# Patient Record
Sex: MALE | Race: WHITE | NOT HISPANIC OR LATINO
[De-identification: names, ages, dates, MRNs, and addresses within clinical notes are randomized per-mention and may not be internally consistent; named-entity substitution may affect disease eponyms.]

---

## 2018-12-17 ENCOUNTER — APPOINTMENT (OUTPATIENT)
Dept: OTOLARYNGOLOGY | Facility: CLINIC | Age: 48
End: 2018-12-17

## 2019-01-02 ENCOUNTER — APPOINTMENT (OUTPATIENT)
Dept: OTOLARYNGOLOGY | Facility: CLINIC | Age: 49
End: 2019-01-02

## 2019-03-06 ENCOUNTER — EMERGENCY (EMERGENCY)
Facility: HOSPITAL | Age: 49
LOS: 1 days | Discharge: HOME | End: 2019-03-06
Attending: EMERGENCY MEDICINE | Admitting: EMERGENCY MEDICINE
Payer: COMMERCIAL

## 2019-03-06 VITALS
RESPIRATION RATE: 19 BRPM | WEIGHT: 315 LBS | HEIGHT: 70 IN | DIASTOLIC BLOOD PRESSURE: 90 MMHG | SYSTOLIC BLOOD PRESSURE: 159 MMHG | TEMPERATURE: 104 F | HEART RATE: 115 BPM | OXYGEN SATURATION: 100 %

## 2019-03-06 DIAGNOSIS — R50.9 FEVER, UNSPECIFIED: ICD-10-CM

## 2019-03-06 PROCEDURE — 93010 ELECTROCARDIOGRAM REPORT: CPT

## 2019-03-06 PROCEDURE — 99284 EMERGENCY DEPT VISIT MOD MDM: CPT | Mod: 25

## 2019-03-06 RX ORDER — SODIUM CHLORIDE 9 MG/ML
1000 INJECTION INTRAMUSCULAR; INTRAVENOUS; SUBCUTANEOUS ONCE
Qty: 0 | Refills: 0 | Status: COMPLETED | OUTPATIENT
Start: 2019-03-06 | End: 2019-03-06

## 2019-03-06 RX ORDER — ACETAMINOPHEN 500 MG
650 TABLET ORAL ONCE
Qty: 0 | Refills: 0 | Status: COMPLETED | OUTPATIENT
Start: 2019-03-06 | End: 2019-03-06

## 2019-03-06 RX ADMIN — SODIUM CHLORIDE 2000 MILLILITER(S): 9 INJECTION INTRAMUSCULAR; INTRAVENOUS; SUBCUTANEOUS at 23:30

## 2019-03-06 RX ADMIN — Medication 650 MILLIGRAM(S): at 23:40

## 2019-03-07 VITALS
RESPIRATION RATE: 17 BRPM | OXYGEN SATURATION: 97 % | HEART RATE: 104 BPM | SYSTOLIC BLOOD PRESSURE: 133 MMHG | DIASTOLIC BLOOD PRESSURE: 78 MMHG | TEMPERATURE: 101 F

## 2019-03-07 LAB
ALBUMIN SERPL ELPH-MCNC: 3.8 G/DL — SIGNIFICANT CHANGE UP (ref 3.3–5)
ALP SERPL-CCNC: 71 U/L — SIGNIFICANT CHANGE UP (ref 40–120)
ALT FLD-CCNC: 111 U/L DA — HIGH (ref 10–45)
ANION GAP SERPL CALC-SCNC: 11 MMOL/L — SIGNIFICANT CHANGE UP (ref 5–17)
APPEARANCE UR: CLEAR — SIGNIFICANT CHANGE UP
AST SERPL-CCNC: 85 U/L — HIGH (ref 10–40)
BASOPHILS # BLD AUTO: 0.1 K/UL — SIGNIFICANT CHANGE UP (ref 0–0.2)
BASOPHILS NFR BLD AUTO: 0.7 % — SIGNIFICANT CHANGE UP (ref 0–2)
BILIRUB SERPL-MCNC: 0.6 MG/DL — SIGNIFICANT CHANGE UP (ref 0.2–1.2)
BILIRUB UR-MCNC: NEGATIVE — SIGNIFICANT CHANGE UP
BUN SERPL-MCNC: 20 MG/DL — SIGNIFICANT CHANGE UP (ref 7–23)
CALCIUM SERPL-MCNC: 9.2 MG/DL — SIGNIFICANT CHANGE UP (ref 8.4–10.5)
CHLORIDE SERPL-SCNC: 99 MMOL/L — SIGNIFICANT CHANGE UP (ref 96–108)
CO2 SERPL-SCNC: 28 MMOL/L — SIGNIFICANT CHANGE UP (ref 22–31)
COLOR SPEC: YELLOW — SIGNIFICANT CHANGE UP
CREAT SERPL-MCNC: 1.51 MG/DL — HIGH (ref 0.5–1.3)
DIFF PNL FLD: NEGATIVE — SIGNIFICANT CHANGE UP
EOSINOPHIL # BLD AUTO: 0.1 K/UL — SIGNIFICANT CHANGE UP (ref 0–0.5)
EOSINOPHIL NFR BLD AUTO: 1.3 % — SIGNIFICANT CHANGE UP (ref 0–6)
FLU A RESULT: SIGNIFICANT CHANGE UP
FLU A RESULT: SIGNIFICANT CHANGE UP
FLUAV AG NPH QL: SIGNIFICANT CHANGE UP
FLUBV AG NPH QL: SIGNIFICANT CHANGE UP
GLUCOSE SERPL-MCNC: 115 MG/DL — HIGH (ref 70–99)
GLUCOSE UR QL: NEGATIVE — SIGNIFICANT CHANGE UP
HCT VFR BLD CALC: 43.4 % — SIGNIFICANT CHANGE UP (ref 39–50)
HGB BLD-MCNC: 13.9 G/DL — SIGNIFICANT CHANGE UP (ref 13–17)
KETONES UR-MCNC: NEGATIVE — SIGNIFICANT CHANGE UP
LACTATE SERPL-SCNC: 1.7 MMOL/L — SIGNIFICANT CHANGE UP (ref 0.7–2)
LEUKOCYTE ESTERASE UR-ACNC: NEGATIVE — SIGNIFICANT CHANGE UP
LYMPHOCYTES # BLD AUTO: 0.4 K/UL — LOW (ref 1–3.3)
LYMPHOCYTES # BLD AUTO: 5.2 % — LOW (ref 13–44)
MCHC RBC-ENTMCNC: 28.9 PG — SIGNIFICANT CHANGE UP (ref 27–34)
MCHC RBC-ENTMCNC: 32 GM/DL — SIGNIFICANT CHANGE UP (ref 32–36)
MCV RBC AUTO: 90.3 FL — SIGNIFICANT CHANGE UP (ref 80–100)
MONOCYTES # BLD AUTO: 0.4 K/UL — SIGNIFICANT CHANGE UP (ref 0–0.9)
MONOCYTES NFR BLD AUTO: 5.3 % — SIGNIFICANT CHANGE UP (ref 1–9)
NEUTROPHILS # BLD AUTO: 7 K/UL — SIGNIFICANT CHANGE UP (ref 1.8–7.4)
NEUTROPHILS NFR BLD AUTO: 87.6 % — HIGH (ref 43–77)
NITRITE UR-MCNC: NEGATIVE — SIGNIFICANT CHANGE UP
PH UR: 5 — SIGNIFICANT CHANGE UP (ref 5–8)
PLATELET # BLD AUTO: 215 K/UL — SIGNIFICANT CHANGE UP (ref 150–400)
POTASSIUM SERPL-MCNC: 3.9 MMOL/L — SIGNIFICANT CHANGE UP (ref 3.5–5.3)
POTASSIUM SERPL-SCNC: 3.9 MMOL/L — SIGNIFICANT CHANGE UP (ref 3.5–5.3)
PROT SERPL-MCNC: 8.2 G/DL — SIGNIFICANT CHANGE UP (ref 6–8.3)
PROT UR-MCNC: NEGATIVE — SIGNIFICANT CHANGE UP
RBC # BLD: 4.8 M/UL — SIGNIFICANT CHANGE UP (ref 4.2–5.8)
RBC # FLD: 14.4 % — SIGNIFICANT CHANGE UP (ref 10.3–14.5)
RSV RESULT: SIGNIFICANT CHANGE UP
RSV RNA RESP QL NAA+PROBE: SIGNIFICANT CHANGE UP
SODIUM SERPL-SCNC: 138 MMOL/L — SIGNIFICANT CHANGE UP (ref 135–145)
SP GR SPEC: 1.01 — SIGNIFICANT CHANGE UP (ref 1.01–1.02)
UROBILINOGEN FLD QL: NEGATIVE — SIGNIFICANT CHANGE UP
WBC # BLD: 8 K/UL — SIGNIFICANT CHANGE UP (ref 3.8–10.5)
WBC # FLD AUTO: 8 K/UL — SIGNIFICANT CHANGE UP (ref 3.8–10.5)

## 2019-03-07 PROCEDURE — 83605 ASSAY OF LACTIC ACID: CPT

## 2019-03-07 PROCEDURE — 99284 EMERGENCY DEPT VISIT MOD MDM: CPT

## 2019-03-07 PROCEDURE — 74176 CT ABD & PELVIS W/O CONTRAST: CPT | Mod: 26

## 2019-03-07 PROCEDURE — 80053 COMPREHEN METABOLIC PANEL: CPT

## 2019-03-07 PROCEDURE — 87631 RESP VIRUS 3-5 TARGETS: CPT

## 2019-03-07 PROCEDURE — 71045 X-RAY EXAM CHEST 1 VIEW: CPT | Mod: 26

## 2019-03-07 PROCEDURE — 85027 COMPLETE CBC AUTOMATED: CPT

## 2019-03-07 PROCEDURE — 93005 ELECTROCARDIOGRAM TRACING: CPT

## 2019-03-07 PROCEDURE — 81001 URINALYSIS AUTO W/SCOPE: CPT

## 2019-03-07 PROCEDURE — 71045 X-RAY EXAM CHEST 1 VIEW: CPT

## 2019-03-07 PROCEDURE — 74176 CT ABD & PELVIS W/O CONTRAST: CPT

## 2019-03-07 RX ORDER — IBUPROFEN 200 MG
600 TABLET ORAL ONCE
Qty: 0 | Refills: 0 | Status: COMPLETED | OUTPATIENT
Start: 2019-03-07 | End: 2019-03-07

## 2019-03-07 RX ADMIN — Medication 600 MILLIGRAM(S): at 01:15

## 2019-03-07 NOTE — ED PROVIDER NOTE - OBJECTIVE STATEMENT
Pertinent PMH/PSH/FHx/SHx and Review of Systems contained within:  48 /o male with h/o HTN and obesity presents to ed c/o body ache and fever started few hours ago. No cough, no headche, no N/V

## 2019-03-07 NOTE — ED PROVIDER NOTE - CLINICAL SUMMARY MEDICAL DECISION MAKING FREE TEXT BOX
pt has fever likely from viral illness as normal labs, negative CXR, hydrate pt has fever likely from viral illness as normal labs, negative CXR, lab mild elevation of creatine, other wise normal, advise to stay hydrated and take tylenol for fever PRN

## 2019-03-07 NOTE — ED ADULT NURSE NOTE - OBJECTIVE STATEMENT
Pt reports fever, chills, body aches x  1 day.  Pt presents with 103.8 temp. Pt is a/ox3, neuro intact, maew.

## 2019-03-07 NOTE — ED ADULT NURSE NOTE - TEMPLATE
After Visit Summary   1/16/2018    Sabina Herron    MRN: [id number]           Patient Information     Date Of Birth          1987        Visit Information        Provider Department      1/16/2018 1:30 PM Aubrie Bello [facility] Fetal [facility]        Today's Diagnoses     Pregnancy related condition, antepartum           Follow-ups after your visit        Your next 10 appointments already scheduled     Feb 16, 2018  1:30 PM CST   (Arrive by 1:15 PM)   MFM US COMPRE SINGLE F/U with [id number]   [facility] Maternal Fetal Medicine Ultrasound - [facility])    [address]  [facility] [address] [phone]           Wear comfortable clothes and leave your valuables at home.            Feb 16, 2018  2:00 PM CST   Radiology MD with  MFM MD   [facility] Maternal Fetal Medicine [facility])    [address]  [facility] [address] [phone]           Please arrive at the time given for your first appointment. This visit is used internally to schedule the physician's time during your ultrasound.              Who to contact     If you have questions or need follow up information about today's clinic visit or your schedule please contact [facility] FETAL [facility] directly at [phone].  Normal or non-critical lab and imaging results will be communicated to you by MyChart, letter or phone within 4 business days after the clinic has received the results. If you do not hear from us within 7 days, please contact the clinic through MyChart or phone. If you have a critical or abnormal lab result, we will notify you by phone as soon as possible.  Submit refill requests through [employer] or call your pharmacy and they will forward the refill request to us. Please allow 3 business days for your refill to be completed.          Additional Information About Your Visit       "  MyChart Information     [employer] lets you send messages to your doctor, view your test results, renew your prescriptions, schedule appointments and more. To sign up, go to [email or web address] . Click on \"Log in\" on the left side of the screen, which will take you to the Welcome page. Then click on \"Sign up Now\" on the right side of the page.     You will be asked to enter the access code listed below, as well as some personal information. Please follow the directions to create your username and password.     Your access code is: [email or web address]  Expires: 2018 11:15 AM     Your access code will  in 90 days. If you need help or a new code, please call your [facility] clinic or [phone].        Care EveryWhere ID     This is your Care EveryWhere ID. This could be used by other organizations to access your [facility] medical records  [id number]        Your Vitals Were     Last Period                   2017 (Approximate)            Blood Pressure from Last 3 Encounters:   14 128/86   07/15/13 123/83    Weight from Last 3 Encounters:   14 108.5 kg (239 lb 3.2 oz)   07/15/13 103 kg (227 lb 1.2 oz)              We Performed the Following     [facility]        Primary Care Provider Office Phone # Fax #    Ron Nicholas [phone]       [address] [address]        Equal Access to Services     OPAL AKINS AH: Hadii aad ku hadasho Soomaali, waaxda luqadaha, qaybta kaalmada adeegyada, jaguar basilio . So [facility] [phone].    ATENCIÓN: Si habla español, tiene a hogan disposición servicios gratuitos de asistencia lingüística. Llame al [phone].    We comply with applicable federal civil rights laws and [place] laws. We do not discriminate on the basis of race, color, national origin, age, disability, sex, sexual orientation, or gender identity.            Thank you!     Thank you for choosing [facility]ERNAL [facility]IC[facility]  for your care. Our goal is always to provide you with excellent care. Hearing back from our patients is one way we can continue to improve our services. Please take a few minutes to complete the written survey that you may receive in the mail after your visit with us. Thank you!             Your Updated Medication List - Protect others around you: Learn how to safely use, store and throw away your medicines at www.disposemymeds.org.          This list is accurate as of: 1/16/18 11:59 PM.  Always use your most recent med list.                   Brand Name Dispense Instructions for use Diagnosis    AMBIEN PO      Take 10 mg by mouth At Bedtime        diazepam 5 MG tablet    VALIUM     Take 5 mg by mouth every 6 hours as needed. Up to 4 times daily        fentaNYL 75 mcg/hr 72 hr patch    DURAGESIC     Place 1 patch onto the skin every 72 hours.        LIDODERM 5 % Patch   Generic drug:  lidocaine      Place 1 patch onto the skin as needed.        LUNESTA 3 MG tablet   Generic drug:  eszopiclone      Take 1 tablet by mouth At Bedtime.        LYRICA 150 MG capsule   Generic drug:  pregabalin      Take 1 capsule by mouth 2 times daily.        oxyCODONE IR 5 MG tablet    ROXICODONE     Take 3 tablets by mouth daily. Up to 10 times daily        prenatal multivitamin plus iron 27-0.8 MG Tabs per tablet      Take 1 tablet by mouth daily        SIMPLY SLEEP 25 MG Tabs   Generic drug:  diphenhydrAMINE HCl (Sleep)      Take 1 tablet by mouth as needed.        TIZANIDINE HCL PO      Take 4 mg by mouth 4 times daily        TRAZODONE HCL PO      Take 1 tablet by mouth daily.           General

## 2019-08-19 ENCOUNTER — APPOINTMENT (OUTPATIENT)
Dept: ORTHOPEDIC SURGERY | Facility: CLINIC | Age: 49
End: 2019-08-19
Payer: COMMERCIAL

## 2019-08-19 VITALS — BODY MASS INDEX: 40.29 KG/M2 | WEIGHT: 272 LBS | HEIGHT: 69 IN

## 2019-08-19 DIAGNOSIS — M17.0 BILATERAL PRIMARY OSTEOARTHRITIS OF KNEE: ICD-10-CM

## 2019-08-19 DIAGNOSIS — S80.01XA CONTUSION OF RIGHT KNEE, INITIAL ENCOUNTER: ICD-10-CM

## 2019-08-19 DIAGNOSIS — Z78.9 OTHER SPECIFIED HEALTH STATUS: ICD-10-CM

## 2019-08-19 DIAGNOSIS — Z82.61 FAMILY HISTORY OF ARTHRITIS: ICD-10-CM

## 2019-08-19 PROBLEM — E66.9 OBESITY, UNSPECIFIED: Chronic | Status: ACTIVE | Noted: 2019-03-07

## 2019-08-19 PROCEDURE — 20610 DRAIN/INJ JOINT/BURSA W/O US: CPT | Mod: RT

## 2019-08-19 PROCEDURE — 99204 OFFICE O/P NEW MOD 45 MIN: CPT | Mod: 25

## 2019-08-19 PROCEDURE — 73562 X-RAY EXAM OF KNEE 3: CPT | Mod: RT

## 2019-08-19 RX ORDER — LEVOTHYROXINE SODIUM 100 UG/1
100 TABLET ORAL
Refills: 0 | Status: ACTIVE | COMMUNITY

## 2019-08-19 RX ORDER — ALLOPURINOL 100 MG/1
100 TABLET ORAL
Refills: 0 | Status: ACTIVE | COMMUNITY

## 2021-09-21 ENCOUNTER — TRANSCRIPTION ENCOUNTER (OUTPATIENT)
Age: 51
End: 2021-09-21

## 2021-10-13 ENCOUNTER — EMERGENCY (EMERGENCY)
Facility: HOSPITAL | Age: 51
LOS: 1 days | Discharge: HOME | End: 2021-10-13
Attending: EMERGENCY MEDICINE | Admitting: EMERGENCY MEDICINE
Payer: MEDICAID

## 2021-10-13 ENCOUNTER — TRANSCRIPTION ENCOUNTER (OUTPATIENT)
Age: 51
End: 2021-10-13

## 2021-10-13 VITALS
SYSTOLIC BLOOD PRESSURE: 176 MMHG | RESPIRATION RATE: 19 BRPM | DIASTOLIC BLOOD PRESSURE: 117 MMHG | HEIGHT: 70 IN | WEIGHT: 244.93 LBS | HEART RATE: 82 BPM | TEMPERATURE: 100 F | OXYGEN SATURATION: 98 %

## 2021-10-13 VITALS
SYSTOLIC BLOOD PRESSURE: 154 MMHG | RESPIRATION RATE: 18 BRPM | DIASTOLIC BLOOD PRESSURE: 89 MMHG | HEART RATE: 89 BPM | OXYGEN SATURATION: 100 %

## 2021-10-13 LAB
ALBUMIN SERPL ELPH-MCNC: 4.2 G/DL — SIGNIFICANT CHANGE UP (ref 3.3–5)
ALP SERPL-CCNC: 105 U/L — SIGNIFICANT CHANGE UP (ref 40–120)
ALT FLD-CCNC: 34 U/L — SIGNIFICANT CHANGE UP (ref 10–45)
ANION GAP SERPL CALC-SCNC: 8 MMOL/L — SIGNIFICANT CHANGE UP (ref 5–17)
APPEARANCE UR: ABNORMAL
AST SERPL-CCNC: 26 U/L — SIGNIFICANT CHANGE UP (ref 10–40)
BACTERIA # UR AUTO: ABNORMAL /HPF
BASOPHILS # BLD AUTO: 0.08 K/UL — SIGNIFICANT CHANGE UP (ref 0–0.2)
BASOPHILS NFR BLD AUTO: 0.5 % — SIGNIFICANT CHANGE UP (ref 0–2)
BILIRUB SERPL-MCNC: 0.8 MG/DL — SIGNIFICANT CHANGE UP (ref 0.2–1.2)
BILIRUB UR-MCNC: NEGATIVE — SIGNIFICANT CHANGE UP
BUN SERPL-MCNC: 28 MG/DL — HIGH (ref 7–23)
CALCIUM SERPL-MCNC: 9.4 MG/DL — SIGNIFICANT CHANGE UP (ref 8.4–10.5)
CHLORIDE SERPL-SCNC: 101 MMOL/L — SIGNIFICANT CHANGE UP (ref 96–108)
CO2 SERPL-SCNC: 31 MMOL/L — SIGNIFICANT CHANGE UP (ref 22–31)
COLOR SPEC: ABNORMAL
CREAT SERPL-MCNC: 1.51 MG/DL — HIGH (ref 0.5–1.3)
DIFF PNL FLD: ABNORMAL
EOSINOPHIL # BLD AUTO: 0.15 K/UL — SIGNIFICANT CHANGE UP (ref 0–0.5)
EOSINOPHIL NFR BLD AUTO: 1 % — SIGNIFICANT CHANGE UP (ref 0–6)
EPI CELLS # UR: SIGNIFICANT CHANGE UP
GLUCOSE SERPL-MCNC: 104 MG/DL — HIGH (ref 70–99)
GLUCOSE UR QL: NEGATIVE — SIGNIFICANT CHANGE UP
HCT VFR BLD CALC: 41.2 % — SIGNIFICANT CHANGE UP (ref 39–50)
HGB BLD-MCNC: 13.5 G/DL — SIGNIFICANT CHANGE UP (ref 13–17)
IMM GRANULOCYTES NFR BLD AUTO: 0.4 % — SIGNIFICANT CHANGE UP (ref 0–1.5)
KETONES UR-MCNC: ABNORMAL
LEUKOCYTE ESTERASE UR-ACNC: NEGATIVE — SIGNIFICANT CHANGE UP
LYMPHOCYTES # BLD AUTO: 1.35 K/UL — SIGNIFICANT CHANGE UP (ref 1–3.3)
LYMPHOCYTES # BLD AUTO: 8.8 % — LOW (ref 13–44)
MCHC RBC-ENTMCNC: 30.9 PG — SIGNIFICANT CHANGE UP (ref 27–34)
MCHC RBC-ENTMCNC: 32.8 GM/DL — SIGNIFICANT CHANGE UP (ref 32–36)
MCV RBC AUTO: 94.3 FL — SIGNIFICANT CHANGE UP (ref 80–100)
MONOCYTES # BLD AUTO: 0.9 K/UL — SIGNIFICANT CHANGE UP (ref 0–0.9)
MONOCYTES NFR BLD AUTO: 5.8 % — SIGNIFICANT CHANGE UP (ref 2–14)
NEUTROPHILS # BLD AUTO: 12.85 K/UL — HIGH (ref 1.8–7.4)
NEUTROPHILS NFR BLD AUTO: 83.5 % — HIGH (ref 43–77)
NITRITE UR-MCNC: NEGATIVE — SIGNIFICANT CHANGE UP
NRBC # BLD: 0 /100 WBCS — SIGNIFICANT CHANGE UP (ref 0–0)
PH UR: 7 — SIGNIFICANT CHANGE UP (ref 5–8)
PLATELET # BLD AUTO: 273 K/UL — SIGNIFICANT CHANGE UP (ref 150–400)
POTASSIUM SERPL-MCNC: 4.2 MMOL/L — SIGNIFICANT CHANGE UP (ref 3.5–5.3)
POTASSIUM SERPL-SCNC: 4.2 MMOL/L — SIGNIFICANT CHANGE UP (ref 3.5–5.3)
PROT SERPL-MCNC: 8.9 G/DL — HIGH (ref 6–8.3)
PROT UR-MCNC: 500 MG/DL
RBC # BLD: 4.37 M/UL — SIGNIFICANT CHANGE UP (ref 4.2–5.8)
RBC # FLD: 13.2 % — SIGNIFICANT CHANGE UP (ref 10.3–14.5)
RBC CASTS # UR COMP ASSIST: >50 /HPF (ref 0–4)
SODIUM SERPL-SCNC: 140 MMOL/L — SIGNIFICANT CHANGE UP (ref 135–145)
SP GR SPEC: 1.01 — SIGNIFICANT CHANGE UP (ref 1.01–1.02)
UROBILINOGEN FLD QL: NEGATIVE — SIGNIFICANT CHANGE UP
WBC # BLD: 15.39 K/UL — HIGH (ref 3.8–10.5)
WBC # FLD AUTO: 15.39 K/UL — HIGH (ref 3.8–10.5)
WBC UR QL: SIGNIFICANT CHANGE UP /HPF (ref 0–5)

## 2021-10-13 PROCEDURE — 96365 THER/PROPH/DIAG IV INF INIT: CPT

## 2021-10-13 PROCEDURE — 99284 EMERGENCY DEPT VISIT MOD MDM: CPT | Mod: 25

## 2021-10-13 PROCEDURE — 99284 EMERGENCY DEPT VISIT MOD MDM: CPT

## 2021-10-13 PROCEDURE — 80053 COMPREHEN METABOLIC PANEL: CPT

## 2021-10-13 PROCEDURE — 74176 CT ABD & PELVIS W/O CONTRAST: CPT | Mod: 26,MA

## 2021-10-13 PROCEDURE — 85025 COMPLETE CBC W/AUTO DIFF WBC: CPT

## 2021-10-13 PROCEDURE — 36415 COLL VENOUS BLD VENIPUNCTURE: CPT

## 2021-10-13 PROCEDURE — 81001 URINALYSIS AUTO W/SCOPE: CPT

## 2021-10-13 PROCEDURE — 74176 CT ABD & PELVIS W/O CONTRAST: CPT | Mod: MA

## 2021-10-13 PROCEDURE — 87086 URINE CULTURE/COLONY COUNT: CPT

## 2021-10-13 RX ORDER — TAMSULOSIN HYDROCHLORIDE 0.4 MG/1
1 CAPSULE ORAL
Qty: 10 | Refills: 0
Start: 2021-10-13 | End: 2021-10-22

## 2021-10-13 RX ORDER — SODIUM CHLORIDE 9 MG/ML
1000 INJECTION INTRAMUSCULAR; INTRAVENOUS; SUBCUTANEOUS ONCE
Refills: 0 | Status: COMPLETED | OUTPATIENT
Start: 2021-10-13 | End: 2021-10-13

## 2021-10-13 RX ORDER — CEFTRIAXONE 500 MG/1
1000 INJECTION, POWDER, FOR SOLUTION INTRAMUSCULAR; INTRAVENOUS ONCE
Refills: 0 | Status: COMPLETED | OUTPATIENT
Start: 2021-10-13 | End: 2021-10-13

## 2021-10-13 RX ORDER — TAMSULOSIN HYDROCHLORIDE 0.4 MG/1
0.8 CAPSULE ORAL ONCE
Refills: 0 | Status: COMPLETED | OUTPATIENT
Start: 2021-10-13 | End: 2021-10-13

## 2021-10-13 RX ADMIN — SODIUM CHLORIDE 2000 MILLILITER(S): 9 INJECTION INTRAMUSCULAR; INTRAVENOUS; SUBCUTANEOUS at 21:41

## 2021-10-13 RX ADMIN — SODIUM CHLORIDE 1000 MILLILITER(S): 9 INJECTION INTRAMUSCULAR; INTRAVENOUS; SUBCUTANEOUS at 22:11

## 2021-10-13 RX ADMIN — TAMSULOSIN HYDROCHLORIDE 0.8 MILLIGRAM(S): 0.4 CAPSULE ORAL at 21:41

## 2021-10-13 RX ADMIN — CEFTRIAXONE 1000 MILLIGRAM(S): 500 INJECTION, POWDER, FOR SOLUTION INTRAMUSCULAR; INTRAVENOUS at 22:11

## 2021-10-13 RX ADMIN — CEFTRIAXONE 100 MILLIGRAM(S): 500 INJECTION, POWDER, FOR SOLUTION INTRAMUSCULAR; INTRAVENOUS at 21:41

## 2021-10-13 NOTE — ED PROVIDER NOTE - PATIENT PORTAL LINK FT
You can access the FollowMyHealth Patient Portal offered by [facility] by registering at the following website: [email or web address]. By joining [employer]’s FollowMyHealth portal, you will also be able to view your health information using other applications (apps) compatible with our system.

## 2021-10-13 NOTE — ED PROVIDER NOTE - CLINICAL SUMMARY MEDICAL DECISION MAKING FREE TEXT BOX
pt with no sig PMHX p/w F/U/D and hematuria, no fever, mod elevation of WBC count, pt was given IV Rocephin and Flomax , he felt better, pt was told about his CT scan result and a small nodule found in bladder , which needs to be f/u with urologist for biopsy/

## 2021-10-13 NOTE — ED PROVIDER NOTE - OBJECTIVE STATEMENT
50 y/o male with h/o obesity presents to ED c/o increased frequency , urgency and hematuria since past few hours, c/o he has problem urinating at night, no flank pain , no fever,  had similar episode 14 years ago,

## 2021-10-14 LAB
CULTURE RESULTS: SIGNIFICANT CHANGE UP
SPECIMEN SOURCE: SIGNIFICANT CHANGE UP

## 2021-10-15 ENCOUNTER — APPOINTMENT (OUTPATIENT)
Dept: AFTER HOURS CARE | Facility: EMERGENCY ROOM | Age: 51
End: 2021-10-15

## 2021-10-15 ENCOUNTER — APPOINTMENT (OUTPATIENT)
Dept: AFTER HOURS CARE | Facility: EMERGENCY ROOM | Age: 51
End: 2021-10-15
Payer: MEDICAID

## 2021-10-16 DIAGNOSIS — R21 RASH AND OTHER NONSPECIFIC SKIN ERUPTION: ICD-10-CM

## 2021-10-16 PROCEDURE — 99214 OFFICE O/P EST MOD 30 MIN: CPT | Mod: 95

## 2021-10-16 NOTE — ASSESSMENT
[FreeTextEntry1] : Gout vs cellulitis, on abx already seeing podiatrist for a corn, requests uric blodo test constantine lab fly told to call back if not improved 24 hr

## 2021-10-16 NOTE — PHYSICAL EXAM
[No Acute Distress] : no acute distress [Normal Sclera/Conjunctiva] : normal sclera/conjunctiva [No Respiratory Distress] : no respiratory distress  [No Focal Deficits] : no focal deficits [de-identified] : R dorsum  warm at tarsal joints, no e.o trauma no fluctiance min redness

## 2021-10-16 NOTE — HISTORY OF PRESENT ILLNESS
[Home] : at home, [unfilled] , at the time of the visit. [Other Location: e.g. Home (Enter Location, City,State)___] : at [unfilled] [Verbal consent obtained from patient] : the patient, [unfilled] [FreeTextEntry8] : pt w hx gout, recent dx uti on cefpox, has been having r dorsum foot pain c/w gout but a little higher. no fever no trauma. usually indocin works. on allopurinol

## 2021-11-17 ENCOUNTER — OUTPATIENT (OUTPATIENT)
Dept: OUTPATIENT SERVICES | Facility: HOSPITAL | Age: 51
LOS: 1 days | End: 2021-11-17
Payer: MEDICAID

## 2021-11-17 ENCOUNTER — APPOINTMENT (OUTPATIENT)
Dept: CT IMAGING | Facility: HOSPITAL | Age: 51
End: 2021-11-17
Payer: MEDICAID

## 2021-11-17 DIAGNOSIS — Z00.8 ENCOUNTER FOR OTHER GENERAL EXAMINATION: ICD-10-CM

## 2021-11-17 PROCEDURE — 74178 CT ABD&PLV WO CNTR FLWD CNTR: CPT | Mod: 26

## 2021-11-17 PROCEDURE — 74178 CT ABD&PLV WO CNTR FLWD CNTR: CPT

## 2021-11-27 ENCOUNTER — APPOINTMENT (OUTPATIENT)
Dept: AFTER HOURS CARE | Facility: EMERGENCY ROOM | Age: 51
End: 2021-11-27

## 2021-11-27 ENCOUNTER — APPOINTMENT (OUTPATIENT)
Dept: AFTER HOURS CARE | Facility: EMERGENCY ROOM | Age: 51
End: 2021-11-27
Payer: MEDICAID

## 2021-11-27 DIAGNOSIS — R31.9 HEMATURIA, UNSPECIFIED: ICD-10-CM

## 2021-11-27 PROCEDURE — 99213 OFFICE O/P EST LOW 20 MIN: CPT | Mod: 95

## 2021-11-30 PROBLEM — R31.9 HEMATURIA OF UNKNOWN CAUSE: Status: ACTIVE | Noted: 2021-11-30

## 2021-11-30 NOTE — ASSESSMENT
[FreeTextEntry1] : max dose flomax once a day for fear of hypotension. Pt does not appear to be bleeding enough to cause\par hypovolemia or anemia, and his CBC a month ago was normal. No dysuria makes infection unlikely and pt under care of specialist for same problem already.

## 2021-11-30 NOTE — HISTORY OF PRESENT ILLNESS
[Home] : at home, [unfilled] , at the time of the visit. [Other Location: e.g. Home (Enter Location, City,State)___] : at [unfilled] [Verbal consent obtained from patient] : the patient, [unfilled] [FreeTextEntry8] : 51M hx OA, gout, now p/w gross hematuria. Last month, pt had similar hematuria. Was initially dx as UTI in UC, was\par seen in [facility] ED, had clean UA and nl CT. Was seen by urologist, given flomax, who saw a thicked wall of bladder on\par cysto. Rpt CT no lesions.\par Tonight pt had gross hematuria with frequency, all similar to last month. Took a flomax which made his pain minimal.\par Pt now inquiring if he can take 0.4mg flomax tabs more frequently than once a day. No dysuria, trauma, abd pain,\par n/v/d, fever/chills, HA. Pt denies dizziness, presyncope, LOC, weakness.

## 2021-11-30 NOTE — PLAN
[No new medications perscribed] : Treat in place: No new medications prescribed [FreeTextEntry1] : flomax once a day only\par urgent urology follow up\par ER for sx of hypovolemia/anemia or profuse bleeding

## 2021-12-02 ENCOUNTER — OUTPATIENT (OUTPATIENT)
Dept: OUTPATIENT SERVICES | Facility: HOSPITAL | Age: 51
LOS: 1 days | End: 2021-12-02
Payer: MEDICAID

## 2021-12-02 VITALS
DIASTOLIC BLOOD PRESSURE: 97 MMHG | OXYGEN SATURATION: 98 % | WEIGHT: 266.76 LBS | HEART RATE: 76 BPM | RESPIRATION RATE: 18 BRPM | SYSTOLIC BLOOD PRESSURE: 147 MMHG | TEMPERATURE: 98 F | HEIGHT: 68 IN

## 2021-12-02 DIAGNOSIS — D49.4 NEOPLASM OF UNSPECIFIED BEHAVIOR OF BLADDER: ICD-10-CM

## 2021-12-02 DIAGNOSIS — M10.9 GOUT, UNSPECIFIED: ICD-10-CM

## 2021-12-02 DIAGNOSIS — E03.9 HYPOTHYROIDISM, UNSPECIFIED: ICD-10-CM

## 2021-12-02 DIAGNOSIS — G47.33 OBSTRUCTIVE SLEEP APNEA (ADULT) (PEDIATRIC): ICD-10-CM

## 2021-12-02 LAB
ANION GAP SERPL CALC-SCNC: 8 MMOL/L — SIGNIFICANT CHANGE UP (ref 5–17)
APPEARANCE UR: CLEAR — SIGNIFICANT CHANGE UP
BACTERIA # UR AUTO: ABNORMAL /HPF
BILIRUB UR-MCNC: NEGATIVE — SIGNIFICANT CHANGE UP
BUN SERPL-MCNC: 20 MG/DL — SIGNIFICANT CHANGE UP (ref 7–23)
CALCIUM SERPL-MCNC: 9.5 MG/DL — SIGNIFICANT CHANGE UP (ref 8.4–10.5)
CHLORIDE SERPL-SCNC: 101 MMOL/L — SIGNIFICANT CHANGE UP (ref 96–108)
CO2 SERPL-SCNC: 29 MMOL/L — SIGNIFICANT CHANGE UP (ref 22–31)
COLOR SPEC: YELLOW — SIGNIFICANT CHANGE UP
CREAT SERPL-MCNC: 1.45 MG/DL — HIGH (ref 0.5–1.3)
DIFF PNL FLD: ABNORMAL
EPI CELLS # UR: SIGNIFICANT CHANGE UP
GLUCOSE SERPL-MCNC: 95 MG/DL — SIGNIFICANT CHANGE UP (ref 70–99)
GLUCOSE UR QL: NEGATIVE — SIGNIFICANT CHANGE UP
HCT VFR BLD CALC: 41.6 % — SIGNIFICANT CHANGE UP (ref 39–50)
HGB BLD-MCNC: 13.6 G/DL — SIGNIFICANT CHANGE UP (ref 13–17)
KETONES UR-MCNC: NEGATIVE — SIGNIFICANT CHANGE UP
LEUKOCYTE ESTERASE UR-ACNC: NEGATIVE — SIGNIFICANT CHANGE UP
MCHC RBC-ENTMCNC: 29.9 PG — SIGNIFICANT CHANGE UP (ref 27–34)
MCHC RBC-ENTMCNC: 32.7 GM/DL — SIGNIFICANT CHANGE UP (ref 32–36)
MCV RBC AUTO: 91.4 FL — SIGNIFICANT CHANGE UP (ref 80–100)
NITRITE UR-MCNC: NEGATIVE — SIGNIFICANT CHANGE UP
NRBC # BLD: 0 /100 WBCS — SIGNIFICANT CHANGE UP (ref 0–0)
PH UR: 5 — SIGNIFICANT CHANGE UP (ref 5–8)
PLATELET # BLD AUTO: 293 K/UL — SIGNIFICANT CHANGE UP (ref 150–400)
POTASSIUM SERPL-MCNC: 4.8 MMOL/L — SIGNIFICANT CHANGE UP (ref 3.5–5.3)
POTASSIUM SERPL-SCNC: 4.8 MMOL/L — SIGNIFICANT CHANGE UP (ref 3.5–5.3)
PROT UR-MCNC: 15
RBC # BLD: 4.55 M/UL — SIGNIFICANT CHANGE UP (ref 4.2–5.8)
RBC # FLD: 13.2 % — SIGNIFICANT CHANGE UP (ref 10.3–14.5)
RBC CASTS # UR COMP ASSIST: ABNORMAL /HPF (ref 0–4)
SODIUM SERPL-SCNC: 138 MMOL/L — SIGNIFICANT CHANGE UP (ref 135–145)
SP GR SPEC: 1.02 — SIGNIFICANT CHANGE UP (ref 1.01–1.02)
UROBILINOGEN FLD QL: NEGATIVE — SIGNIFICANT CHANGE UP
WBC # BLD: 8.43 K/UL — SIGNIFICANT CHANGE UP (ref 3.8–10.5)
WBC # FLD AUTO: 8.43 K/UL — SIGNIFICANT CHANGE UP (ref 3.8–10.5)
WBC UR QL: ABNORMAL /HPF (ref 0–5)

## 2021-12-02 PROCEDURE — 80048 BASIC METABOLIC PNL TOTAL CA: CPT

## 2021-12-02 PROCEDURE — 93005 ELECTROCARDIOGRAM TRACING: CPT

## 2021-12-02 PROCEDURE — 81001 URINALYSIS AUTO W/SCOPE: CPT

## 2021-12-02 PROCEDURE — 93010 ELECTROCARDIOGRAM REPORT: CPT | Mod: NC

## 2021-12-02 PROCEDURE — 85027 COMPLETE CBC AUTOMATED: CPT

## 2021-12-02 PROCEDURE — 36415 COLL VENOUS BLD VENIPUNCTURE: CPT

## 2021-12-02 PROCEDURE — 87086 URINE CULTURE/COLONY COUNT: CPT

## 2021-12-02 PROCEDURE — G0463: CPT

## 2021-12-02 RX ORDER — LEVOTHYROXINE SODIUM 125 MCG
1 TABLET ORAL
Qty: 0 | Refills: 0 | DISCHARGE

## 2021-12-02 RX ORDER — ALLOPURINOL 300 MG
200 TABLET ORAL
Qty: 0 | Refills: 0 | DISCHARGE

## 2021-12-02 NOTE — H&P PST ADULT - NEGATIVE GENERAL GENITOURINARY SYMPTOMS
no renal colic/no flank pain L/no flank pain R/no bladder infections/no incontinence/no dysuria/no urinary hesitancy/no nocturia

## 2021-12-02 NOTE — H&P PST ADULT - HISTORY OF PRESENT ILLNESS
52yo with medical h/o BREN uses CPAP, Hypothyroid, Gout and reports recent Hematuria, and was evaluated by Urologists and diagnosed with Bladder polyp. Pt presents today for PST for scheduled Cystoscopy w/Bladder Biopsy, TURB on 12/7/2021

## 2021-12-02 NOTE — H&P PST ADULT - SKIN/BREAST
----- Message from DENIZ Bashir MD sent at 5/8/2017 10:59 AM CDT -----  Please refill her Dyazide for 6 months  
Per Dr. Bashir, Dyazide #90 with 1 refill is faxed to [facility] and patient will have her Potassium level re-checked again in 6 months.  
negative

## 2021-12-02 NOTE — H&P PST ADULT - VENOUS THROMBOEMBOLISM BMI
----- Message from Nora Mathews sent at 10/22/2020  9:56 AM EDT -----  Regarding: /refill  Medication Refill    Caller (if not patient):      Relationship of caller (if not patient):      Best contact number(s):[phone]      Name of medication and dosage if known:\"Adderall\" 20 mg 2x daily      Is patient out of this medication (yes/no):no, only 2 days left      Pharmacy name:[facility]    Pharmacy listed in chart? (yes/no):  Pharmacy phone number: [phone]      Details to clarify the request:Pt requested a refill on the Rx and stated there are no refills left, and requested a refill called in today, if poss.       Nora Mathews
Refill request(s) approved--Adderall. Prescription Monitoring Program ([place]) database query with fills:  09/24/2020  1   08/25/2020  Dextroamp-Amphetamin 20 MG Tab  60.00  30 Cl [facility]   [id number]   [facility] ([id number][facility]   [place]   [facility]   [place]   08/25/2020  1   08/25/2020  Dextroamp-Amphetamin 20 MG Tab  60.00  30 Cl [facility]   [id number]   [facility] ([id number])   [place]   07/20/2020  1   07/18/2020  Dextroamp-Amphetamin 20 MG Tab  60.00  30 Cl [facility]   [id number]   [facility] ([id number])   0   [place]     Requested Prescriptions     Signed Prescriptions Disp Refills    dextroamphetamine-amphetamine (ADDERALL) 20 mg tablet 60 Tab 0     Sig: Take 20mg in AM and 20mg again in afternoon as needed. Authorizing Provider: Vivek Leal script written to fill on 10-24-20, as reviewed. Receipt electronically verified by pharmacy.
This nurse called patient to  explained that her prescription will be at the pharmacy on 10/24/2020. It will be electronically sent on that date and she will be able to  that prescription on then.
(1) obesity (BMI greater than 25)

## 2021-12-02 NOTE — H&P PST ADULT - FALL HARM RISK - UNIVERSAL INTERVENTIONS
Bed in lowest position, wheels locked, appropriate side rails in place/Call bell, personal items and telephone in reach/Instruct patient to call for assistance before getting out of bed or chair/Non-slip footwear when patient is out of bed/[place] to call system/Physically safe environment - no spills, clutter or unnecessary equipment/Purposeful Proactive Rounding/Room/bathroom lighting operational, light cord in reach

## 2021-12-02 NOTE — H&P PST ADULT - NSICDXPASTMEDICALHX_GEN_ALL_CORE_FT
PAST MEDICAL HISTORY:  Bladder polyp     Generalized obesity     Gout     Hypothyroid     BREN on CPAP

## 2021-12-02 NOTE — H&P PST ADULT - PROBLEM SELECTOR PLAN 1
Pre-op instructions given. Pt verbalized understanding  Pending: M/C for elevated BP with no dx of HTN   Pending: Covidpcr test/results

## 2021-12-02 NOTE — H&P PST ADULT - TEACHING/LEARNING LEARNING PREFERENCES
75F with T2DM, HTN, osteoporosis, EDUAR on CPAP, diverticulosis s/p resection, hx hysterectomy who comes to ER after syncopal episode. Patient states she had back pain that started one day ago; pain was located in paraspinal region of lower back, non radiating, not associated with numbness, urinary retention or incontinence. She reports no trauma or heavy lifting that precipitated back pain. She then went to do laundry and on carrying the laundry basket felt that she could not move her leg and fell down on her left shoulder. She then came to the ER and reports waiting several hours without being seen and thus went to urgent care where she was standing for chest x-ray and passed out. She reports no presyncopal symptoms; no CP, dizziness, change in hearing or vision, diaphoresis, palpitations. When she passed out she was lying on the floor and expressed urge to urinate; urgent care staff did not want her to stand up and thus she urinated on herself. She insists she has no episodes of incontinence. This has happened to her once before however she did not pass out but experienced presyncopal symptoms such as diaphoresis and dizziness while sitting down and doing her granddaughter's hair that resolved with some fresh air. She was brought to [facility] at that time and reports having complete work up that was negative for any cause of syncope. She denies CP, SOB, fever, chills, abdominal pain, nausea, vomiting, diarrhea, dysuria. Currently she is feeling much better than when she came in.     Hospital course:  EKG: NSR at 82 bpm  CE x2: Negative  CXR: Right paratracheal widening may be technical rather than representing lymphadenopathy and repeat PA and lateral radiograph recommended. No consolidation.  X-ray left shoulder: Limited examination without evidence of acute fracture or dislocation.  X-ray pelvis: No displaced pelvic fracture.  lumbar Xray: No compression fractures. Lower lumbar posterior facet arthrosis. Otherwise, preserved intervertebral disc spaces. Trace grade 1 anterolisthesis of L4 on L5 however without associated spondylolysis defects. Remaining vertebral body alignment maintained. Advanced pubic symphysis degenerative change noted. Unremarkable SI joints and partially visualized hips. Generalized osteopenia otherwise no discrete lytic or blastic lesions. Scant atherosclerotic abdominal aortic calcifications noted.  CT head: No CT evidence of acute intracranial hemorrhage, brain edema, or mass effect.  WBC: 12.02  Glucose: 185  UA: Large LE, UCx less than 50K strep  orthostatic negative   2/7 Carotid Doppler: Mild heterogenous plaque noted within the proximal right and left internal carotid arteries, consistent with 16-49% stenoses in both proximal vessels. No hemodynamically significant stenoses noted.    CE negative pt seen by cardiology recommended carotid doppler and TTE. TTE****. Syncope likely associated with vasovagal. Pt with + UA but low colonies count on UCx, pt asymptomatic  no need for abx. PT recommended home with home PT. As per Dr Wang pt cleared for discharge on *** 75F with T2DM, HTN, osteoporosis, EDUAR on CPAP, diverticulosis s/p resection, hx hysterectomy who comes to ER after syncopal episode. Patient states she had back pain that started one day ago; pain was located in paraspinal region of lower back, non radiating, not associated with numbness, urinary retention or incontinence. She reports no trauma or heavy lifting that precipitated back pain. She then went to do laundry and on carrying the laundry basket felt that she could not move her leg and fell down on her left shoulder. She then came to the ER and reports waiting several hours without being seen and thus went to urgent care where she was standing for chest x-ray and passed out. She reports no presyncopal symptoms; no CP, dizziness, change in hearing or vision, diaphoresis, palpitations. When she passed out she was lying on the floor and expressed urge to urinate; urgent care staff did not want her to stand up and thus she urinated on herself. She insists she has no episodes of incontinence. This has happened to her once before however she did not pass out but experienced presyncopal symptoms such as diaphoresis and dizziness while sitting down and doing her granddaughter's hair that resolved with some fresh air. She was brought to [facility] at that time and reports having complete work up that was negative for any cause of syncope. She denies CP, SOB, fever, chills, abdominal pain, nausea, vomiting, diarrhea, dysuria. Currently she is feeling much better than when she came in.     Hospital course:  EKG: NSR at 82 bpm  CE x2: Negative  CXR: Right paratracheal widening may be technical rather than representing lymphadenopathy and repeat PA and lateral radiograph recommended. No consolidation.  X-ray left shoulder: Limited examination without evidence of acute fracture or dislocation.  X-ray pelvis: No displaced pelvic fracture.  lumbar Xray: No compression fractures. Lower lumbar posterior facet arthrosis. Otherwise, preserved intervertebral disc spaces. Trace grade 1 anterolisthesis of L4 on L5 however without associated spondylolysis defects. Remaining vertebral body alignment maintained. Advanced pubic symphysis degenerative change noted. Unremarkable SI joints and partially visualized hips. Generalized osteopenia otherwise no discrete lytic or blastic lesions. Scant atherosclerotic abdominal aortic calcifications noted.  CT head: No CT evidence of acute intracranial hemorrhage, brain edema, or mass effect.  WBC: 12.02  Glucose: 185  UA: Large LE, UCx less than 50K strep  orthostatic negative   2/7 Carotid Doppler: Mild heterogenous plaque noted within the proximal right and left internal carotid arteries, consistent with 16-49% stenoses in both proximal vessels. No hemodynamically significant stenoses noted.  2/8 TTE: Mitral annular calcification, otherwise normal mitral valve. Minimal mitral regurgitation. Calcified trileaflet aortic valve with normal opening. Mild aortic regurgitation. Normal left ventricular internal dimensions and wall thicknesses. Endocardium not well visualized; grossly normal left ventricular systolic function. The right ventricle is not well visualized; grossly normal right ventricular systolic function.    CE negative pt seen by cardiology recommended carotid doppler and TTE. TTE with normal LV. Syncope likely associated with vasovagal. Pt with + UA but low colonies count on UCx, pt asymptomatic  no need for abx. PT recommended home with home PT. As per Dr Wang pt cleared for discharge on 2/9/18 75F with T2DM, HTN, osteoporosis, EDUAR on CPAP, diverticulosis s/p resection, hx hysterectomy who comes to ER after syncopal episode. Patient states she had back pain that started one day ago; pain was located in paraspinal region of lower back, non radiating, not associated with numbness, urinary retention or incontinence. She reports no trauma or heavy lifting that precipitated back pain. She then went to do laundry and on carrying the laundry basket felt that she could not move her leg and fell down on her left shoulder. She then came to the ER and reports waiting several hours without being seen and thus went to urgent care where she was standing for chest x-ray and passed out. She reports no presyncopal symptoms; no CP, dizziness, change in hearing or vision, diaphoresis, palpitations. When she passed out she was lying on the floor and expressed urge to urinate; urgent care staff did not want her to stand up and thus she urinated on herself. She insists she has no episodes of incontinence. This has happened to her once before however she did not pass out but experienced presyncopal symptoms such as diaphoresis and dizziness while sitting down and doing her granddaughter's hair that resolved with some fresh air. She was brought to [facility] at that time and reports having complete work up that was negative for any cause of syncope. She denies CP, SOB, fever, chills, abdominal pain, nausea, vomiting, diarrhea, dysuria. Currently she is feeling much better than when she came in.     Hospital course:  EKG: NSR at 82 bpm  CE x2: Negative  CXR: Right paratracheal widening may be technical rather than representing lymphadenopathy and repeat PA and lateral radiograph recommended. No consolidation.  X-ray left shoulder: Limited examination without evidence of acute fracture or dislocation.  X-ray pelvis: No displaced pelvic fracture.  lumbar Xray: No compression fractures. Lower lumbar posterior facet arthrosis. Otherwise, preserved intervertebral disc spaces. Trace grade 1 anterolisthesis of L4 on L5 however without associated spondylolysis defects. Remaining vertebral body alignment maintained. Advanced pubic symphysis degenerative change noted. Unremarkable SI joints and partially visualized hips. Generalized osteopenia otherwise no discrete lytic or blastic lesions. Scant atherosclerotic abdominal aortic calcifications noted.  CT head: No CT evidence of acute intracranial hemorrhage, brain edema, or mass effect.  WBC: 12.02  Glucose: 185  UA: Large LE, UCx less than 50K strep  orthostatic negative   2/7 Carotid Doppler: Mild heterogenous plaque noted within the proximal right and left internal carotid arteries, consistent with 16-49% stenoses in both proximal vessels. No hemodynamically significant stenoses noted.  2/8 TTE: Mitral annular calcification, otherwise normal mitral valve. Minimal mitral regurgitation. Calcified trileaflet aortic valve with normal opening. Mild aortic regurgitation. Normal left ventricular internal dimensions and wall thicknesses. Endocardium not well visualized; grossly normal left ventricular systolic function. The right ventricle is not well visualized; grossly normal right ventricular systolic function.    CE negative pt seen by cardiology recommended carotid doppler and TTE. TTE with normal LV. Syncope likely associated with vasovagal. Pt with + UA but low colonies count on UCx, pt asymptomatic  no need for abx. PT recommended home with home PT. As per Dr Wang pt cleared for discharge on 2/9/18 individual instruction/verbal instruction/video/written material

## 2021-12-02 NOTE — H&P PST ADULT - NSICDXFAMILYHX_GEN_ALL_CORE_FT
FAMILY HISTORY:  Father  Still living? No  FH: heart disease, Age at diagnosis: Age Unknown    Mother  Still living? Yes, Estimated age: Age Unknown  FH: heart disease, Age at diagnosis: Age Unknown

## 2021-12-03 LAB
CULTURE RESULTS: SIGNIFICANT CHANGE UP
SPECIMEN SOURCE: SIGNIFICANT CHANGE UP

## 2021-12-08 ENCOUNTER — APPOINTMENT (OUTPATIENT)
Dept: UROLOGY | Facility: CLINIC | Age: 51
End: 2021-12-08
Payer: MEDICAID

## 2021-12-08 VITALS
DIASTOLIC BLOOD PRESSURE: 97 MMHG | HEIGHT: 70 IN | WEIGHT: 245 LBS | OXYGEN SATURATION: 97 % | SYSTOLIC BLOOD PRESSURE: 151 MMHG | HEART RATE: 63 BPM | BODY MASS INDEX: 35.07 KG/M2

## 2021-12-08 DIAGNOSIS — Z87.39 PERSONAL HISTORY OF OTHER DISEASES OF THE MUSCULOSKELETAL SYSTEM AND CONNECTIVE TISSUE: ICD-10-CM

## 2021-12-08 DIAGNOSIS — Z86.39 PERSONAL HISTORY OF OTHER ENDOCRINE, NUTRITIONAL AND METABOLIC DISEASE: ICD-10-CM

## 2021-12-08 PROCEDURE — 99204 OFFICE O/P NEW MOD 45 MIN: CPT

## 2021-12-08 RX ORDER — SILODOSIN 4 MG/1
4 CAPSULE ORAL DAILY
Qty: 30 | Refills: 1 | Status: ACTIVE | COMMUNITY
Start: 2021-12-08 | End: 1900-01-01

## 2021-12-09 PROBLEM — D41.4 NEOPLASM OF UNCERTAIN BEHAVIOR OF BLADDER: Chronic | Status: ACTIVE | Noted: 2021-12-02

## 2021-12-09 PROBLEM — E03.9 HYPOTHYROIDISM, UNSPECIFIED: Chronic | Status: ACTIVE | Noted: 2021-12-02

## 2021-12-09 PROBLEM — M10.9 GOUT, UNSPECIFIED: Chronic | Status: ACTIVE | Noted: 2021-12-02

## 2021-12-09 PROBLEM — G47.33 OBSTRUCTIVE SLEEP APNEA (ADULT) (PEDIATRIC): Chronic | Status: ACTIVE | Noted: 2021-12-02

## 2021-12-09 LAB
APPEARANCE: CLEAR
BACTERIA: NEGATIVE
BILIRUBIN URINE: NEGATIVE
BLOOD URINE: NEGATIVE
COLOR: YELLOW
GLUCOSE QUALITATIVE U: NEGATIVE
HYALINE CASTS: 0 /LPF
KETONES URINE: NEGATIVE
LEUKOCYTE ESTERASE URINE: NEGATIVE
MICROSCOPIC-UA: NORMAL
NITRITE URINE: NEGATIVE
PH URINE: 6.5
PROTEIN URINE: NEGATIVE
RED BLOOD CELLS URINE: 1 /HPF
SPECIFIC GRAVITY URINE: 1.02
SQUAMOUS EPITHELIAL CELLS: 1 /HPF
UROBILINOGEN URINE: NORMAL
WHITE BLOOD CELLS URINE: 1 /HPF

## 2021-12-10 LAB — BACTERIA UR CULT: NORMAL

## 2021-12-13 LAB — URINE CYTOLOGY: NORMAL

## 2021-12-15 ENCOUNTER — APPOINTMENT (OUTPATIENT)
Dept: UROLOGY | Facility: CLINIC | Age: 51
End: 2021-12-15
Payer: MEDICAID

## 2021-12-15 VITALS
DIASTOLIC BLOOD PRESSURE: 86 MMHG | HEART RATE: 65 BPM | SYSTOLIC BLOOD PRESSURE: 136 MMHG | BODY MASS INDEX: 35.07 KG/M2 | WEIGHT: 245 LBS | HEIGHT: 70 IN | OXYGEN SATURATION: 97 %

## 2021-12-15 DIAGNOSIS — N32.89 OTHER SPECIFIED DISORDERS OF BLADDER: ICD-10-CM

## 2021-12-15 DIAGNOSIS — R31.0 GROSS HEMATURIA: ICD-10-CM

## 2021-12-15 DIAGNOSIS — N40.1 BENIGN PROSTATIC HYPERPLASIA WITH LOWER URINARY TRACT SYMPMS: ICD-10-CM

## 2021-12-15 DIAGNOSIS — D49.4 NEOPLASM OF UNSPECIFIED BEHAVIOR OF BLADDER: ICD-10-CM

## 2021-12-15 DIAGNOSIS — N13.8 BENIGN PROSTATIC HYPERPLASIA WITH LOWER URINARY TRACT SYMPMS: ICD-10-CM

## 2021-12-15 PROCEDURE — 99214 OFFICE O/P EST MOD 30 MIN: CPT | Mod: 25

## 2021-12-15 PROCEDURE — 52000 CYSTOURETHROSCOPY: CPT

## 2021-12-20 PROBLEM — N40.1 BPH WITH OBSTRUCTION/LOWER URINARY TRACT SYMPTOMS: Status: ACTIVE | Noted: 2021-12-08

## 2021-12-20 PROBLEM — Z86.39 HISTORY OF HYPOTHYROIDISM: Status: RESOLVED | Noted: 2021-12-20 | Resolved: 2021-12-20

## 2021-12-20 PROBLEM — N32.89 BLADDER MASS: Status: ACTIVE | Noted: 2021-12-08

## 2021-12-20 PROBLEM — Z87.39 HISTORY OF GOUT: Status: RESOLVED | Noted: 2021-12-20 | Resolved: 2021-12-20

## 2021-12-20 PROBLEM — R31.0 GROSS HEMATURIA: Status: ACTIVE | Noted: 2021-12-20

## 2021-12-20 NOTE — ASSESSMENT
[FreeTextEntry1] : Benign Prostatic Hyperplasia:\par Did not take Silodosin. \par No change in urination. \par Will try Silodosin. \par \par Gross hematuria:\par Bladder tumor:\par Discussed Cystoscopy findings. Patient denied previous resection or biopsy or history of Diverticulitis. \par Recommended Transurethral Resection of Bladder Tumor with Intravesical Mitomycin Instillation. \par The operation, benefits, alternatives, risks and possible complications were explained to the patient in great detail.\par The specific risks of this procedure include, but are not limited to: injury to or perforation of prostate capsule or bladder, ureteral injury or obstruction, additional procedures to correct injury (stent, percutaneous renal drainage), urinary tract infection, pyelonephritis, irritative voiding symptoms, incontinence, gross hematuria, clot retention, may require prolonged catheterization or recatheterization, inability to remove all the cancer, inability to get bladder muscle tissue in the specimen for accurate staging, repeat procedures may need to be performed, recurrence of cancer is possible, bladder neck contracture, and erectile dysfunction. \par Discussed that anesthesiologist will discuss the risks and complications of the anesthesia in detail separately. \par Explained that I am doing these procedures at [facility] and that Patient will need medical clearance from PCP and pre-surgical testing at [facility] before the procedure. \par All questions were fully and satisfactorily answered. \par Will schedule for next available.

## 2021-12-20 NOTE — LETTER BODY
[Dear  ___] : Dear  [unfilled], [Courtesy Letter:] : I had the pleasure of seeing your patient, [unfilled], in my office today. [Please see my note below.] : Please see my note below. [Sincerely,] : Sincerely, [FreeTextEntry3] : Chris Bejarano MD\par  of Urology\par [facility] [employer]\par \par Offices:\par [facility] @\par [address], [place] [address]\par and\par [address], [place] [address], [address]\par and\par [address] [address]\par \par TEL: [phone]\par FAX: [phone]

## 2021-12-20 NOTE — ASSESSMENT
[FreeTextEntry1] : Reviewed outside records. \par \par Gross hematuria:\par Bladder mass:\par Discussed the differential diagnosis of hematuria including benign and malignant pathology- including but not limited to nephrolithiasis, bladder stone, urinary tract infection, glomerular disease, renal cancer, bladder cancer, prostate cancer. We also discussed the chance that workup will not reveal a source for the bleeding. The patient understands that the hematuria could be from an upper tract (kidney or ureter) or lower tract (bladder, urethra, prostate) and that workup includes imaging and direct visualization of all of these.\par \par Will proceed with work up with Urinalysis with microscopy, Urine culture, Urine cytology and Cystoscopy. \par \par Benign Prostatic Hyperplasia:\par Discussed treatment options.\par Will try Rapaflo 4 mg. Discussed similar side effect profile as Flomax with slightly increased incidence of retrograde ejaculation. \par \par Return to clinic for next available Cystoscopy.

## 2021-12-20 NOTE — HISTORY OF PRESENT ILLNESS
[FreeTextEntry1] : 51 year old male presents for follow up. \par Did not try Rapaflo. \par No new episode of gross hematuria. \par \par Initially seen for Bladder mass.  \par Here for 2nd opinion. In November had gross hematuria, now resolved. Went to [facility] and had non contrasted CT scan Abdomen and Pelvis: Bladder wall thickening was seen. Saw Dr Wall and underwent CT Urogram and Cystoscopy. Was told has Bladder mass and needs Transurethral Resection of Bladder Tumor. \par Has history of gross hematuria, last episode in 2008. Had negative work up including Cystoscopy. \par Reports variable stream, urinates every 3-4 hours or so during the day. Nocturia of 2-5 x. \par Endorses off and on urgency and sense of incomplete emptying. \par Denies dysuria, lower abdominal or flank pain, fever, chills or rigors.\par Has tried Flomax in the past, had back pain. \par No personal or family history of kidney stone. \par Non smoker. \par No family history of Prostate cancer. \par \par \par

## 2021-12-20 NOTE — HISTORY OF PRESENT ILLNESS
[FreeTextEntry1] : 51 year old male presents for Bladder tumor. \par Here for 2nd opinion. In November had gross hematuria, now resolved. Went to [facility] and had non contrasted CT scan Abdomen and Pelvis: Bladder wall thickening was seen. Saw Dr Wall and underwent CT Urogram and Cystoscopy. Was told has Bladder mass and needs Transurethral Resection of Bladder Tumor. \par Has history of gross hematuria, last episode in 2008. Had negative work up including Cystoscopy. \par Reports variable stream, urinates every 3-4 hours or so during the day. Nocturia of 2-5 x. \par Endorses off and on urgency and sense of incomplete emptying. \par Denies dysuria, lower abdominal or flank pain, fever, chills or rigors.\par Has tried Flomax in the past, had back pain. \par No personal or family history of kidney stone. \par Non smoker. \par No family history of Prostate cancer. \par \par \par

## 2021-12-20 NOTE — PHYSICAL EXAM
[Normal Appearance] : normal appearance [General Appearance - In No Acute Distress] : no acute distress [FreeTextEntry1] : normal peripheral circulation  [] : no respiratory distress [Abdomen Soft] : soft [Abdomen Tenderness] : non-tender [Costovertebral Angle Tenderness] : no ~M costovertebral angle tenderness [Urethral Meatus] : meatus normal [Penis Abnormality] : normal circumcised penis [Scrotum] : the scrotum was normal [Epididymis] : the epididymides were normal [Testes Tenderness] : no tenderness of the testes [Testes Mass (___cm)] : there were no testicular masses [Prostate Tenderness] : the prostate was not tender [No Prostate Nodules] : no prostate nodules [Prostate Size ___ (0-4)] : prostate size [unfilled] (scale: 0-4) [Normal Station and Gait] : the gait and station were normal for the patient's age [Skin Color & Pigmentation] : normal skin color and pigmentation [No Focal Deficits] : no focal deficits [Oriented To Time, Place, And Person] : oriented to person, place, and time

## 2021-12-26 ENCOUNTER — APPOINTMENT (OUTPATIENT)
Dept: AFTER HOURS CARE | Facility: EMERGENCY ROOM | Age: 51
End: 2021-12-26
Payer: MEDICAID

## 2021-12-26 ENCOUNTER — APPOINTMENT (OUTPATIENT)
Dept: AFTER HOURS CARE | Facility: EMERGENCY ROOM | Age: 51
End: 2021-12-26

## 2021-12-26 PROCEDURE — 99213 OFFICE O/P EST LOW 20 MIN: CPT | Mod: 95

## 2021-12-26 NOTE — PLAN
[No new medications perscribed] : Treat in place: No new medications prescribed [FreeTextEntry1] : follow up COVID PCR. If negative, can come out of isolation\par anticipatory guidance.

## 2021-12-26 NOTE — HISTORY OF PRESENT ILLNESS
[Home] : at home, [unfilled] , at the time of the visit. [Other Location: e.g. Home (Enter Location, City,State)___] : at [unfilled] [Verbal consent obtained from patient] : the patient, [unfilled] [FreeTextEntry8] : 51M hx BPH, OA, gout now p/w request for advice on leaving isolation for COVID. Pt was having headaches 12d ago, tested positive 1 week ago, but for the past 5d has been isolating without symptoms. Pt was PCR tested again today, results pending.

## 2022-03-28 ENCOUNTER — TRANSCRIPTION ENCOUNTER (OUTPATIENT)
Age: 52
End: 2022-03-28

## 2022-07-09 ENCOUNTER — NON-APPOINTMENT (OUTPATIENT)
Age: 52
End: 2022-07-09

## 2022-07-11 ENCOUNTER — APPOINTMENT (OUTPATIENT)
Dept: AFTER HOURS CARE | Facility: EMERGENCY ROOM | Age: 52
End: 2022-07-11

## 2022-07-11 DIAGNOSIS — U07.1 COVID-19: ICD-10-CM

## 2022-07-11 PROCEDURE — 99204 OFFICE O/P NEW MOD 45 MIN: CPT | Mod: 95

## 2022-07-11 PROCEDURE — 99214 OFFICE O/P EST MOD 30 MIN: CPT

## 2022-07-20 PROBLEM — U07.1 COVID-19 VIRUS INFECTION: Status: ACTIVE | Noted: 2021-12-26

## 2022-07-20 NOTE — HISTORY OF PRESENT ILLNESS
[Home] : at home, [unfilled] , at the time of the visit. [Other Location: e.g. Home (Enter Location, City,State)___] : at [unfilled] [Verbal consent obtained from patient] : the patient, [unfilled] [FreeTextEntry8] : 50 Yo m hx of hypothyroidism, gout, BREN on cpap, c/o covid symptoms. Pt reports symptoms started yesterday and he took a covid test that was positive yesterday. He reports Fever, stuffy nose and malaise. The nasal congestion triggers his anxiety, for which he took Xanax yesterday. Pt has been taking Tylenol, motrin, Zyrtec and afrin for his symptoms and is concerned about not taking more than the daily recommendation of these medications.

## 2022-07-20 NOTE — ASSESSMENT
[FreeTextEntry1] : Pt w/ aforementioned presentation concerning for covid19. Pt has no dyspnea, no respiratory distress, requesting paxlovid

## 2022-07-20 NOTE — PLAN
[FreeTextEntry1] : \par 1)	Will send the Rx for paxlovid to [address]\par 2)	Pt advised to take ibuprofen alternating with acetaminophen as needed for bodyaches or fevers. I discussed with the patient the maximum daily dosages of these medications and how often to take them\par 3)	Pt encouraged to hydrate with Gatorade\par 4)	Discussed warning signs of severe symptoms necessitating ER evaluation\par

## 2022-07-20 NOTE — PHYSICAL EXAM
[de-identified] : General: pt appears stated age and is anxious but in nad, speaking in full clear sentences\par HEENT: AT/NC, pink conjunctiva, anicteric sclerae, EOMI, mmm\par Neck: full ROM, trachea midline\par Lungs: no respiratory distress\par Neuro: awake, alert, responsive; oriented to person, place and time; cranial nerves grossly intact, EOMI intact jaw movement, no facial asymmetry, hearing intact\par

## 2022-11-21 ENCOUNTER — NON-APPOINTMENT (OUTPATIENT)
Age: 52
End: 2022-11-21

## 2022-12-18 ENCOUNTER — NON-APPOINTMENT (OUTPATIENT)
Age: 52
End: 2022-12-18

## 2023-01-10 ENCOUNTER — NON-APPOINTMENT (OUTPATIENT)
Age: 53
End: 2023-01-10

## 2023-01-10 ENCOUNTER — APPOINTMENT (OUTPATIENT)
Dept: AFTER HOURS CARE | Facility: EMERGENCY ROOM | Age: 53
End: 2023-01-10
Payer: MEDICAID

## 2023-01-10 DIAGNOSIS — N39.0 URINARY TRACT INFECTION, SITE NOT SPECIFIED: ICD-10-CM

## 2023-01-10 PROCEDURE — 99214 OFFICE O/P EST MOD 30 MIN: CPT | Mod: 95

## 2023-01-10 RX ORDER — CIPROFLOXACIN HYDROCHLORIDE 500 MG/1
500 TABLET, FILM COATED ORAL
Qty: 20 | Refills: 0 | Status: ACTIVE | COMMUNITY
Start: 2023-01-10 | End: 1900-01-01

## 2023-01-10 NOTE — ASSESSMENT
[FreeTextEntry1] : 52y M w/ PMHx Hypothyroidism, Gout, BPH, hx of bladder tumor presenting with intermittent dysuria and incomplete bladder emptying x2.5 weeks. Differential includes UTI, urethritis, urethral pain syndrome. Will obtain UA/UCx, discussed w/ patient antibiotic stewardship and avoidance of antibiotics if no bacterial infection present, pt verbalized understanding. Plan to send rx of ciprofloxacin to pharmacy to only be picked up if UA+. Patient also to schedule follow-up with his Urologist. At this time on clinical concern for nephrolithiasis, pyelo, or prostatitis.

## 2023-01-10 NOTE — REVIEW OF SYSTEMS
[Fever] : no fever [Chills] : no chills [Chest Pain] : no chest pain [Shortness Of Breath] : no shortness of breath [Abdominal Pain] : no abdominal pain [Nausea] : no nausea [Vomiting] : no vomiting [Incontinence] : no incontinence [Hesitancy] : no hesitancy [Nocturia] : no nocturia [Hematuria] : no hematuria [Muscle Pain] : no muscle pain [Skin Rash] : no skin rash [Headache] : no headache [Dizziness] : no dizziness [FreeTextEntry8] : no flank pain

## 2023-01-10 NOTE — PLAN
[FreeTextEntry1] : -rx cipro to pharmacy only to be filled if UA+, pt verbalized understanding \par -increase fluid intake, increase bladder emptying\par -f/u w/ urologist\par -precautionary guidance discussed with pt

## 2023-01-10 NOTE — HISTORY OF PRESENT ILLNESS
[FreeTextEntry8] : 52y M w/ PMHx Hypothyroidism, Gout, BPH, hx of bladder tumor presenting with intermittent dysuria and incomplete bladder emptying x2.5 weeks. Patient states he previously had a CT A/P for gross hematuria which shoulder c/f possible bladder tumor had f/u cystoscopy which was initially negative, then went to different Urologist for second opinion and was told small bladder lesion present but pathology reported non-malignant. States he has been experiencing mild dysuria intermittently for 2.5-3 weeks which he has had in the past, but states he has never been dx w/ UTI. Denies current hematuria, fever, chills, flank pain, nausea, vomiting.

## 2023-01-16 NOTE — ED ADULT NURSE NOTE - NSFALLRSKOUTCOME_ED_ALL_ED
Normal vision: sees adequately in most situations; can see medication labels, newsprint
Universal Safety Interventions

## 2023-02-12 ENCOUNTER — NON-APPOINTMENT (OUTPATIENT)
Age: 53
End: 2023-02-12

## 2023-06-14 ENCOUNTER — NON-APPOINTMENT (OUTPATIENT)
Age: 53
End: 2023-06-14

## 2023-08-23 ENCOUNTER — NON-APPOINTMENT (OUTPATIENT)
Age: 53
End: 2023-08-23

## 2024-08-29 ENCOUNTER — NON-APPOINTMENT (OUTPATIENT)
Age: 54
End: 2024-08-29

## 2025-01-26 ENCOUNTER — NON-APPOINTMENT (OUTPATIENT)
Age: 55
End: 2025-01-26

## 2025-03-27 NOTE — ED ADULT NURSE NOTE - CAS EDN DISCHARGE INTERVENTIONS
Physical Therapy Treatment    Patient Name:  Vaibhav Vargas   MRN:  [id number]    Recommendations     Therapy Intensity Recommendations at Discharge: High Intensity Therapy  Discharge Equipment Recommendations: to be determined by next level of care   Barriers to discharge: fall risk, severity of deficits, level of skilled assistance required, decreased endurance, impaired cognitive status, decreased safety awareness, insight into deficits, and complicated medical history    Assessment     Vaibhav Vargas is a 74 y.o. male admitted with a medical diagnosis of :   1. Non-traumatic rhabdomyolysis    2. Screening due    3. PRIYA (acute kidney injury)    4. NSTEMI (non-ST elevated myocardial infarction)    5. Chest pain    6. Syncope       Problem List[1]   He presents with the following impairments/functional limitations:  weakness, impaired endurance, impaired sensation, impaired self care skills, impaired functional mobility, gait instability, impaired balance, impaired cognition, decreased upper extremity function, decreased safety awareness, decreased ROM, impaired coordination, impaired fine motor, impaired skin, edema, decreased lower extremity function.    Rehab Prognosis: Good.    Pt. Tolerated sitting up in chair x 2 hours well.  Pt. Continues to require 2 person assist with transfers, secondary to significant R knee buckling, weakness, and poor endurance.  Pt. With B LE swelling with R LE>L LE.  Pt. Educated in importance of elevating lower extremities while seated in recliner chair.  Pt. Is motivated, pleasant and agreeable during session.     Patient would benefit from continued skilled acute PT services to: address above listed impairments/functional limitations; receive patient/caregiver education; reduce fall risk; and maximize independency/safety with functional mobility.    Recent Surgery: * No surgery found *      Plan     During this hospitalization, patient to be seen 5 x/week to address the identified  impairments/functional limitations via therapeutic activities, therapeutic exercises, neuromuscular re-education and progress toward the established goals.    Plan of Care Expires:  04/23/25    Subjective     Communicated with patient's nurse (Melania) prior to session.    Patient agreeable to participate in treatment session.    Chief Complaint: None stated.  Patient/Family Comments/goals: Get stronger.  Pain/Comfort:  Pain Rating 1: 0/10  Pain Rating Post-Intervention 1: 0/10    Objective     Patient found supine in bed, with HOB elevated, and with bed rails up bilateral HOB, left FOB, and right FOB with PureWick, peripheral IV, telemetry  upon PT entry to room.    General Precautions: Standard, fall   Orthopedic Precautions:N/A   Braces:  N/A  Respiratory Status: room air    Functional Mobility:    Bed Mobility:  Rolling Left: maximal assistance and of 2 persons  Rolling Right: maximal assistance and of 2 persons  Supine to Sit: moderate assistance towards left side    Transfers:  Sit to Stand: moderate assistance and of 2 persons with rolling walker and without RW  Stand to Sit: moderate assistance and of 2 persons with rolling walker and without RW  Bed to Chair: maximal assistance and of 2 persons with no assistive device using Stand Pivot  Attempted with RW, however, unable to complete.    Gait:  N/A    Other Mobility:  N/A    Patient left sitting in recliner chair (upright sitting position) with all lines intact, call button in reach, tray table at bedside, and patient's nurse notified.    Pt. Seen from 12:30-12:40:  Pt. Sat up in chair for about 2 hours.  Seated anterior scooting with Mod A  Sit>stand from recliner chair with Max A x 2 people  SPT from recliner chair>bed with Total A x 2 people   Sit>supine with Max A x 2 people.    Pt. Left supine with head of bed elevated, call button and all other needs within reach.  Nurse notified.    DME Justifications:  To be determined by next level of  care.    Education     Patient educated on and assisted with functional mobility as noted above.  Patient educated on PT Plan of Care.  Patient was instructed to utilize staff assistance for mobility/transfers.  White board updated regarding patient's safest level of mobility with staff assistance    Goals     Multidisciplinary Problems       Physical Therapy Goals          Problem: Physical Therapy    Goal Priority Disciplines Outcome Interventions   Physical Therapy Goal     PT, PT/OT Progressing    Description: Goals to be met by: 2025     Patient will increase functional independence with mobility by performin. Supine to sit with Contact Guard Assistance  2. Sit to stand transfer with Contact Guard Assistance  3. Bed to chair transfer with Contact Guard Assistance using Rolling Walker vs HemiWalker  4. Gait  x 50 feet with Moderate Assistance using Rolling Walker vs HemiWalker  5. Sitting at edge of bed x10 minutes with Stand-by Assistance  6. Stand for 10 minutes with Contact Guard Assistance using Rolling Walker vs HemiWalker  Reviewed 3/26/2025                         Time Tracking     PT Received On: 25  PT Start Time: 1018     PT Stop Time: 1034  PT Total Time (min): 16 min     PT Start Time: 12:30  PT Stop Time: 12:40  PT Total Time (min): 10 minutes    Billable Minutes: Therapeutic Activity 16 minutes (AM), 10 minutes (PM)    *Co- treatment performed due to patient's multiple deficits requiring 2 skilled therapists to appropriately and safely assess patient's strength and endurance while facilitating functional tasks in addition to accommodating for patient's activity tolerance.     2025       [1]   Patient Active Problem List  Diagnosis    Chronic low back pain    Edema    Obesity    Osteoarthritis of knee    Primary hypertension    Type 2 diabetes mellitus    Coronary artery disease    Myocardial infarction      IV discontinued, cath removed intact

## 2025-04-22 NOTE — H&P PST ADULT - NS PRO FEM  PAP SMEARS 3YRS
[email or web address]      
not applicable (Male)

## 2025-06-17 ENCOUNTER — NON-APPOINTMENT (OUTPATIENT)
Age: 55
End: 2025-06-17